# Patient Record
Sex: MALE | Race: WHITE | NOT HISPANIC OR LATINO | URBAN - METROPOLITAN AREA
[De-identification: names, ages, dates, MRNs, and addresses within clinical notes are randomized per-mention and may not be internally consistent; named-entity substitution may affect disease eponyms.]

---

## 2018-08-07 ENCOUNTER — APPOINTMENT (RX ONLY)
Dept: URBAN - METROPOLITAN AREA CLINIC 344 | Facility: CLINIC | Age: 38
Setting detail: DERMATOLOGY
End: 2018-08-07

## 2018-08-07 DIAGNOSIS — D22 MELANOCYTIC NEVI: ICD-10-CM

## 2018-08-07 DIAGNOSIS — L0391 CELLULITIS AND ABSCESS OF UNSPECIFIED SITES: ICD-10-CM

## 2018-08-07 DIAGNOSIS — L0390 CELLULITIS AND ABSCESS OF UNSPECIFIED SITES: ICD-10-CM

## 2018-08-07 DIAGNOSIS — L81.4 OTHER MELANIN HYPERPIGMENTATION: ICD-10-CM

## 2018-08-07 PROBLEM — L03.313 CELLULITIS OF CHEST WALL: Status: ACTIVE | Noted: 2018-08-07

## 2018-08-07 PROBLEM — D22.5 MELANOCYTIC NEVI OF TRUNK: Status: ACTIVE | Noted: 2018-08-07

## 2018-08-07 PROBLEM — D22.39 MELANOCYTIC NEVI OF OTHER PARTS OF FACE: Status: ACTIVE | Noted: 2018-08-07

## 2018-08-07 PROCEDURE — ? PRESCRIPTION

## 2018-08-07 PROCEDURE — ? COUNSELING

## 2018-08-07 PROCEDURE — ? TREATMENT REGIMEN

## 2018-08-07 PROCEDURE — ? ORDER TESTS

## 2018-08-07 PROCEDURE — 99202 OFFICE O/P NEW SF 15 MIN: CPT

## 2018-08-07 RX ORDER — SULFAMETHOXAZOLE AND TRIMETHOPRIM 800; 160 MG/1; MG/1
TABLET ORAL BID
Qty: 20 | Refills: 0 | Status: ERX | COMMUNITY
Start: 2018-08-07

## 2018-08-07 RX ORDER — MUPIROCIN 20 MG/G
OINTMENT TOPICAL
Qty: 1 | Refills: 3 | Status: ERX | COMMUNITY
Start: 2018-08-07

## 2018-08-07 RX ADMIN — MUPIROCIN: 20 OINTMENT TOPICAL at 18:17

## 2018-08-07 RX ADMIN — SULFAMETHOXAZOLE AND TRIMETHOPRIM: 800; 160 TABLET ORAL at 18:17

## 2018-08-07 ASSESSMENT — LOCATION DETAILED DESCRIPTION DERM
LOCATION DETAILED: NASAL DORSUM
LOCATION DETAILED: RIGHT SUPERIOR MEDIAL UPPER BACK
LOCATION DETAILED: SUPERIOR LUMBAR SPINE
LOCATION DETAILED: LEFT SUPERIOR MEDIAL UPPER BACK
LOCATION DETAILED: RIGHT MEDIAL INFERIOR CHEST
LOCATION DETAILED: LEFT LATERAL INFERIOR CHEST
LOCATION DETAILED: RIGHT MEDIAL MALAR CHEEK

## 2018-08-07 ASSESSMENT — LOCATION SIMPLE DESCRIPTION DERM
LOCATION SIMPLE: CHEST
LOCATION SIMPLE: NOSE
LOCATION SIMPLE: RIGHT CHEEK
LOCATION SIMPLE: LOWER BACK
LOCATION SIMPLE: LEFT UPPER BACK
LOCATION SIMPLE: RIGHT UPPER BACK

## 2018-08-07 ASSESSMENT — LOCATION ZONE DERM
LOCATION ZONE: NOSE
LOCATION ZONE: FACE
LOCATION ZONE: TRUNK

## 2018-08-07 NOTE — PROCEDURE: TREATMENT REGIMEN
Initiate Treatment: Bactrim -Take 2 pills daily for 10 days \\nMupirocin Ointment -Twice daily to chest \\n( sample given ) Trianex -Once a day after Mupirocin Ointment
Detail Level: Zone
Samples Given: Aveeno Body wash
Discontinue Regimen: Keflex
Initiate Treatment: Bactrim - 2 pills daily for 10 days

## 2018-08-13 ENCOUNTER — RX ONLY (OUTPATIENT)
Age: 38
Setting detail: RX ONLY
End: 2018-08-13

## 2018-08-13 RX ORDER — DOXYCYCLINE 100 MG/1
CAPSULE ORAL
Qty: 42 | Refills: 0 | Status: ERX | COMMUNITY
Start: 2018-08-13

## 2018-09-07 ENCOUNTER — APPOINTMENT (RX ONLY)
Dept: URBAN - METROPOLITAN AREA CLINIC 344 | Facility: CLINIC | Age: 38
Setting detail: DERMATOLOGY
End: 2018-09-07

## 2018-09-07 DIAGNOSIS — L0390 CELLULITIS AND ABSCESS OF UNSPECIFIED SITES: ICD-10-CM | Status: RESOLVED

## 2018-09-07 DIAGNOSIS — L0391 CELLULITIS AND ABSCESS OF UNSPECIFIED SITES: ICD-10-CM | Status: RESOLVED

## 2018-09-07 DIAGNOSIS — A69.20 LYME DISEASE, UNSPECIFIED: ICD-10-CM

## 2018-09-07 PROBLEM — L03.313 CELLULITIS OF CHEST WALL: Status: ACTIVE | Noted: 2018-09-07

## 2018-09-07 PROCEDURE — ? COUNSELING

## 2018-09-07 PROCEDURE — 99213 OFFICE O/P EST LOW 20 MIN: CPT

## 2018-09-07 ASSESSMENT — LOCATION SIMPLE DESCRIPTION DERM
LOCATION SIMPLE: ABDOMEN
LOCATION SIMPLE: CHEST

## 2018-09-07 ASSESSMENT — LOCATION DETAILED DESCRIPTION DERM
LOCATION DETAILED: LEFT LATERAL INFERIOR CHEST
LOCATION DETAILED: LEFT RIB CAGE

## 2018-09-07 ASSESSMENT — LOCATION ZONE DERM: LOCATION ZONE: TRUNK
